# Patient Record
Sex: FEMALE | URBAN - METROPOLITAN AREA
[De-identification: names, ages, dates, MRNs, and addresses within clinical notes are randomized per-mention and may not be internally consistent; named-entity substitution may affect disease eponyms.]

---

## 2021-06-22 ENCOUNTER — APPOINTMENT (RX ONLY)
Dept: URBAN - METROPOLITAN AREA CLINIC 47 | Facility: CLINIC | Age: 51
Setting detail: DERMATOLOGY
End: 2021-06-22

## 2021-06-22 DIAGNOSIS — L91.0 HYPERTROPHIC SCAR: ICD-10-CM

## 2021-06-22 PROCEDURE — ? DEFER

## 2021-06-22 PROCEDURE — ? ADDITIONAL NOTES

## 2021-06-22 PROCEDURE — ? PRESCRIPTION

## 2021-06-22 PROCEDURE — ? FULL BODY SKIN EXAM - DECLINED

## 2021-06-22 PROCEDURE — 99203 OFFICE O/P NEW LOW 30 MIN: CPT

## 2021-06-22 PROCEDURE — ? COUNSELING

## 2021-06-22 RX ORDER — FLURANDRENOLIDE 4 UG/CM2
TAPE TOPICAL
Qty: 1 | Refills: 3 | Status: ERX | COMMUNITY
Start: 2021-06-22

## 2021-06-22 RX ADMIN — FLURANDRENOLIDE: 4 TAPE TOPICAL at 00:00

## 2021-06-22 ASSESSMENT — LOCATION DETAILED DESCRIPTION DERM
LOCATION DETAILED: LEFT LABIUM MAJUS
LOCATION DETAILED: PERIUMBILICAL SKIN

## 2021-06-22 ASSESSMENT — LOCATION ZONE DERM
LOCATION ZONE: VULVA
LOCATION ZONE: TRUNK

## 2021-06-22 ASSESSMENT — LOCATION SIMPLE DESCRIPTION DERM
LOCATION SIMPLE: ABDOMEN
LOCATION SIMPLE: LABIA MAJORA

## 2021-06-22 NOTE — PROCEDURE: DEFER
Reason To Defer Override: refer to [facility] for evaluation
Detail Level: Detailed
Introduction Text (Please End With A Colon): The following procedure was deferred:

## 2021-06-22 NOTE — PROCEDURE: ADDITIONAL NOTES
Detail Level: Simple
Render Risk Assessment In Note?: no
Additional Notes: Previously treated with steroid injections from pcp